# Patient Record
Sex: FEMALE | Race: BLACK OR AFRICAN AMERICAN | NOT HISPANIC OR LATINO | URBAN - METROPOLITAN AREA
[De-identification: names, ages, dates, MRNs, and addresses within clinical notes are randomized per-mention and may not be internally consistent; named-entity substitution may affect disease eponyms.]

---
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
## 2023-07-20 NOTE — ED PROVIDER NOTE - GASTROINTESTINAL, MLM
Care Management Initial Assessment        RUR: 7% Low Risk  Readmission? No  1st IM letter given? No  1st  letter given: No       07/20/23 1151   Service Assessment   Patient Orientation Alert and Oriented   Cognition Alert   Primary Caregiver Self   Support Systems Spouse/Significant Other   Patient's Healthcare Decision Maker is: Patient Declined (Legal Next of Kin Remains as Decision Maker)   PCP Verified by CM No   Prior Functional Level Independent in ADLs/IADLs   Current Functional Level Independent in ADLs/IADLs   Can patient return to prior living arrangement Yes   Ability to make needs known: Good   Family able to assist with home care needs: Yes   Would you like for me to discuss the discharge plan with any other family members/significant others, and if so, who? No   Financial Resources None   Social/Functional History   Lives With Significant other   Type of Home Apartment   Home Layout One level   Home Access Elevator   Bathroom Shower/Tub Walk-in shower   [address] [facility] [address] [facility] Responsibilities Yes   Meal Prep Responsibility Primary   Laundry Responsibility Primary   Cleaning Responsibility Primary   Bill Paying/Finance Responsibility Primary   Shopping Responsibility Primary   Dependent Care Responsibility Primary   Ambulation Assistance Independent   Transfer Assistance Independent   Active  Yes   Mode of Transportation Car   Education [address]   Occupation Full time employment   Discharge Planning   Type of Residence Apartment   Living Arrangements Spouse/Significant Other   Current Services Prior To Admission None   Potential Assistance Needed N/A   DME Ordered?  No   Potential Assistance Purchasing Medications Yes   Type of Home Care Services None   Patient Abdomen soft, non-tender, no guarding.